# Patient Record
Sex: FEMALE | Race: WHITE | Employment: UNEMPLOYED | URBAN - NONMETROPOLITAN AREA
[De-identification: names, ages, dates, MRNs, and addresses within clinical notes are randomized per-mention and may not be internally consistent; named-entity substitution may affect disease eponyms.]

---

## 2020-03-18 ENCOUNTER — APPOINTMENT (OUTPATIENT)
Dept: GENERAL RADIOLOGY | Age: 50
End: 2020-03-18
Payer: MEDICAID

## 2020-03-18 ENCOUNTER — HOSPITAL ENCOUNTER (EMERGENCY)
Age: 50
Discharge: HOME | End: 2020-03-18
Attending: EMERGENCY MEDICINE
Payer: MEDICAID

## 2020-03-18 VITALS
HEART RATE: 98 BPM | TEMPERATURE: 97.8 F | RESPIRATION RATE: 17 BRPM | WEIGHT: 230 LBS | DIASTOLIC BLOOD PRESSURE: 97 MMHG | SYSTOLIC BLOOD PRESSURE: 156 MMHG | OXYGEN SATURATION: 96 % | HEIGHT: 60 IN | BODY MASS INDEX: 45.16 KG/M2

## 2020-03-18 LAB
ALBUMIN SERPL-MCNC: 4.2 G/DL (ref 3.5–5.2)
ALP BLD-CCNC: 69 U/L (ref 35–104)
ALT SERPL-CCNC: 21 U/L (ref 5–33)
ANION GAP SERPL CALCULATED.3IONS-SCNC: 17 MMOL/L (ref 7–19)
AST SERPL-CCNC: 18 U/L (ref 5–32)
BASOPHILS ABSOLUTE: 0.1 K/UL (ref 0–0.2)
BASOPHILS RELATIVE PERCENT: 0.7 % (ref 0–1)
BILIRUB SERPL-MCNC: <0.2 MG/DL (ref 0.2–1.2)
BUN BLDV-MCNC: 11 MG/DL (ref 6–20)
CALCIUM SERPL-MCNC: 8.9 MG/DL (ref 8.6–10)
CHLORIDE BLD-SCNC: 104 MMOL/L (ref 98–111)
CO2: 21 MMOL/L (ref 22–29)
CREAT SERPL-MCNC: 0.6 MG/DL (ref 0.5–0.9)
D DIMER: <0.27 UG/ML FEU (ref 0–0.48)
EOSINOPHILS ABSOLUTE: 0.1 K/UL (ref 0–0.6)
EOSINOPHILS RELATIVE PERCENT: 1.2 % (ref 0–5)
GFR NON-AFRICAN AMERICAN: >60
GLUCOSE BLD-MCNC: 157 MG/DL (ref 74–109)
HCG QUALITATIVE: NEGATIVE
HCT VFR BLD CALC: 36.5 % (ref 37–47)
HEMOGLOBIN: 10.4 G/DL (ref 12–16)
IMMATURE GRANULOCYTES #: 0.1 K/UL
LYMPHOCYTES ABSOLUTE: 2 K/UL (ref 1.1–4.5)
LYMPHOCYTES RELATIVE PERCENT: 25.8 % (ref 20–40)
MCH RBC QN AUTO: 21.8 PG (ref 27–31)
MCHC RBC AUTO-ENTMCNC: 28.5 G/DL (ref 33–37)
MCV RBC AUTO: 76.7 FL (ref 81–99)
MONOCYTES ABSOLUTE: 0.5 K/UL (ref 0–0.9)
MONOCYTES RELATIVE PERCENT: 6 % (ref 0–10)
NEUTROPHILS ABSOLUTE: 5 K/UL (ref 1.5–7.5)
NEUTROPHILS RELATIVE PERCENT: 65.6 % (ref 50–65)
PDW BLD-RTO: 18.5 % (ref 11.5–14.5)
PLATELET # BLD: 390 K/UL (ref 130–400)
PMV BLD AUTO: 9.2 FL (ref 9.4–12.3)
POTASSIUM SERPL-SCNC: 3.6 MMOL/L (ref 3.5–5)
RBC # BLD: 4.76 M/UL (ref 4.2–5.4)
SODIUM BLD-SCNC: 142 MMOL/L (ref 136–145)
TOTAL PROTEIN: 7.5 G/DL (ref 6.6–8.7)
TROPONIN: <0.01 NG/ML (ref 0–0.03)
TROPONIN: <0.01 NG/ML (ref 0–0.03)
WBC # BLD: 7.6 K/UL (ref 4.8–10.8)

## 2020-03-18 PROCEDURE — 36415 COLL VENOUS BLD VENIPUNCTURE: CPT

## 2020-03-18 PROCEDURE — 85025 COMPLETE CBC W/AUTO DIFF WBC: CPT

## 2020-03-18 PROCEDURE — 99285 EMERGENCY DEPT VISIT HI MDM: CPT

## 2020-03-18 PROCEDURE — 71045 X-RAY EXAM CHEST 1 VIEW: CPT

## 2020-03-18 PROCEDURE — 80053 COMPREHEN METABOLIC PANEL: CPT

## 2020-03-18 PROCEDURE — 93005 ELECTROCARDIOGRAM TRACING: CPT | Performed by: EMERGENCY MEDICINE

## 2020-03-18 PROCEDURE — 85379 FIBRIN DEGRADATION QUANT: CPT

## 2020-03-18 PROCEDURE — 84484 ASSAY OF TROPONIN QUANT: CPT

## 2020-03-18 PROCEDURE — 84703 CHORIONIC GONADOTROPIN ASSAY: CPT

## 2020-03-18 SDOH — HEALTH STABILITY: MENTAL HEALTH: HOW OFTEN DO YOU HAVE A DRINK CONTAINING ALCOHOL?: NEVER

## 2020-03-18 ASSESSMENT — ENCOUNTER SYMPTOMS
SORE THROAT: 0
VOMITING: 0
NAUSEA: 0
CONSTIPATION: 0
RHINORRHEA: 0
WHEEZING: 0
COLOR CHANGE: 0
PHOTOPHOBIA: 0
COUGH: 0
CHEST TIGHTNESS: 0
ABDOMINAL PAIN: 0
EYE PAIN: 0
CHOKING: 1
TROUBLE SWALLOWING: 0
BACK PAIN: 0
DIARRHEA: 0
SHORTNESS OF BREATH: 1
ABDOMINAL DISTENTION: 0

## 2020-03-18 ASSESSMENT — PAIN DESCRIPTION - PAIN TYPE: TYPE: ACUTE PAIN

## 2020-03-18 ASSESSMENT — PAIN DESCRIPTION - LOCATION: LOCATION: CHEST

## 2020-03-18 ASSESSMENT — PAIN SCALES - GENERAL: PAINLEVEL_OUTOF10: 7

## 2020-03-19 LAB
EKG P AXIS: 52 DEGREES
EKG P-R INTERVAL: 148 MS
EKG Q-T INTERVAL: 328 MS
EKG QRS DURATION: 84 MS
EKG QTC CALCULATION (BAZETT): 424 MS
EKG T AXIS: 8 DEGREES

## 2020-03-19 NOTE — ED PROVIDER NOTES
confusion, hallucinations and suicidal ideas. PAST MEDICALHISTORY     Past Medical History:   Diagnosis Date    Anemia     Asthma     Hypoglycemia          SURGICAL HISTORY       Past Surgical History:   Procedure Laterality Date    CERVIX SURGERY       SECTION           CURRENT MEDICATIONS     Previous Medications    No medications on file       ALLERGIES     Codeine and Eucalyptus oil    FAMILY HISTORY     History reviewed. No pertinent family history.        SOCIAL HISTORY       Social History     Socioeconomic History    Marital status:      Spouse name: None    Number of children: None    Years of education: None    Highest education level: None   Occupational History    None   Social Needs    Financial resource strain: None    Food insecurity     Worry: None     Inability: None    Transportation needs     Medical: None     Non-medical: None   Tobacco Use    Smoking status: Never Smoker    Smokeless tobacco: Never Used   Substance and Sexual Activity    Alcohol use: Never     Frequency: Never    Drug use: Never    Sexual activity: None   Lifestyle    Physical activity     Days per week: None     Minutes per session: None    Stress: None   Relationships    Social connections     Talks on phone: None     Gets together: None     Attends Orthodoxy service: None     Active member of club or organization: None     Attends meetings of clubs or organizations: None     Relationship status: None    Intimate partner violence     Fear of current or ex partner: None     Emotionally abused: None     Physically abused: None     Forced sexual activity: None   Other Topics Concern    None   Social History Narrative    None       SCREENINGS             PHYSICAL EXAM    (up to 7 for level 4, 8 or more for level 5)     ED Triage Vitals [20]   BP Temp Temp Source Pulse Resp SpO2 Height Weight   (!) 160/94 97.8 °F (36.6 °C) Oral 111 22 96 % 5' (1.524 m) 230 lb (104.3 kg) Physical Exam  Vitals signs and nursing note reviewed. Constitutional:       General: She is not in acute distress. Appearance: Normal appearance. She is well-developed. She is not ill-appearing. HENT:      Head: Normocephalic and atraumatic. Mouth/Throat:      Mouth: Mucous membranes are moist.      Pharynx: Oropharynx is clear. Eyes:      Conjunctiva/sclera: Conjunctivae normal.      Pupils: Pupils are equal, round, and reactive to light. Neck:      Musculoskeletal: Normal range of motion and neck supple. Vascular: No JVD. Cardiovascular:      Rate and Rhythm: Normal rate and regular rhythm. Heart sounds: Normal heart sounds. No murmur. Pulmonary:      Effort: Pulmonary effort is normal.      Breath sounds: Normal breath sounds. No wheezing, rhonchi or rales. Abdominal:      General: Bowel sounds are normal. There is no distension. Palpations: Abdomen is soft. Tenderness: There is no abdominal tenderness. There is no guarding or rebound. Musculoskeletal: Normal range of motion. Skin:     General: Skin is warm and dry. Capillary Refill: Capillary refill takes less than 2 seconds. Neurological:      General: No focal deficit present. Mental Status: She is alert and oriented to person, place, and time. Cranial Nerves: No cranial nerve deficit. Psychiatric:         Behavior: Behavior normal.         Thought Content: Thought content normal.         Judgment: Judgment normal.         DIAGNOSTIC RESULTS     EKG: All EKG's areinterpreted by the Emergency Department Physician who either signs or Co-signs this chart in the absence of a cardiologist.    Sinus tachycardia with a rate of 115, normal axis, no acute ST elevations or depressions.     RADIOLOGY:  Non-plain film images such as CT, Ultrasound and MRI are read by the radiologist. Plain radiographic images are visualized and preliminarily interpreted bythe emergency physician with the below

## 2020-06-08 ENCOUNTER — HOSPITAL ENCOUNTER (OUTPATIENT)
Dept: GENERAL RADIOLOGY | Facility: HOSPITAL | Age: 50
Discharge: HOME | End: 2020-06-08
Admitting: NURSE PRACTITIONER

## 2020-06-08 ENCOUNTER — TRANSCRIBE ORDERS (OUTPATIENT)
Dept: ADMINISTRATIVE | Facility: HOSPITAL | Age: 50
End: 2020-06-08

## 2020-06-08 DIAGNOSIS — M25.572 PAIN IN LEFT ANKLE AND JOINTS OF LEFT FOOT: ICD-10-CM

## 2020-06-08 DIAGNOSIS — M25.562 LEFT KNEE PAIN, UNSPECIFIED CHRONICITY: Primary | ICD-10-CM

## 2020-06-08 PROCEDURE — 73610 X-RAY EXAM OF ANKLE: CPT

## 2020-06-08 PROCEDURE — 73562 X-RAY EXAM OF KNEE 3: CPT

## 2020-06-10 ENCOUNTER — TRANSCRIBE ORDERS (OUTPATIENT)
Dept: ADMINISTRATIVE | Facility: HOSPITAL | Age: 50
End: 2020-06-10

## 2020-06-10 DIAGNOSIS — M25.562 LEFT KNEE PAIN, UNSPECIFIED CHRONICITY: Primary | ICD-10-CM

## 2020-06-17 ENCOUNTER — HOSPITAL ENCOUNTER (OUTPATIENT)
Dept: MRI IMAGING | Facility: HOSPITAL | Age: 50
Discharge: HOME | End: 2020-06-17
Admitting: NURSE PRACTITIONER

## 2020-06-17 DIAGNOSIS — M25.562 LEFT KNEE PAIN, UNSPECIFIED CHRONICITY: ICD-10-CM

## 2020-06-17 PROCEDURE — 73721 MRI JNT OF LWR EXTRE W/O DYE: CPT

## 2020-07-08 ENCOUNTER — HOSPITAL ENCOUNTER (EMERGENCY)
Age: 50
Discharge: HOME | End: 2020-07-08
Attending: EMERGENCY MEDICINE
Payer: MEDICAID

## 2020-07-08 VITALS
DIASTOLIC BLOOD PRESSURE: 98 MMHG | BODY MASS INDEX: 44.92 KG/M2 | HEART RATE: 104 BPM | OXYGEN SATURATION: 93 % | TEMPERATURE: 97.9 F | RESPIRATION RATE: 17 BRPM | WEIGHT: 230 LBS | SYSTOLIC BLOOD PRESSURE: 148 MMHG

## 2020-07-08 PROCEDURE — 99999 PR OFFICE/OUTPT VISIT,PROCEDURE ONLY: CPT | Performed by: EMERGENCY MEDICINE

## 2020-07-08 PROCEDURE — 99283 EMERGENCY DEPT VISIT LOW MDM: CPT

## 2020-07-08 RX ORDER — NAPROXEN 500 MG/1
500 TABLET ORAL 2 TIMES DAILY WITH MEALS
Qty: 180 TABLET | Refills: 1 | Status: SHIPPED | OUTPATIENT
Start: 2020-07-08

## 2020-07-08 RX ORDER — CYCLOBENZAPRINE HCL 10 MG
10 TABLET ORAL 3 TIMES DAILY PRN
Qty: 15 TABLET | Refills: 0 | Status: SHIPPED | OUTPATIENT
Start: 2020-07-08 | End: 2020-07-18

## 2020-07-08 ASSESSMENT — ENCOUNTER SYMPTOMS
EYE REDNESS: 0
EYE PAIN: 0
SHORTNESS OF BREATH: 0
VOMITING: 0
DIARRHEA: 0
VOICE CHANGE: 0
ABDOMINAL PAIN: 0
RHINORRHEA: 0
COUGH: 0

## 2020-07-08 NOTE — ED PROVIDER NOTES
[facility] EMERGENCY DEPT  EMERGENCY DEPARTMENT ENCOUNTER      Pt Name: Zach Gaviria  MRN: [id number]  [place] 1970  Date of evaluation: 7/8/2020  Provider: Louis Myers MD    [address] [place] [address]       Chief Complaint   Patient presents with    Leg Pain     left leg pain since Mother's Day         HISTORY OF PRESENT ILLNESS   (Location/Symptom, Timing/Onset,Context/Setting, Quality, Duration, Modifying Factors, Severity)  Note limiting factors. Zach Gaviria is a 53 y.o. female who presents to the emergency department with complaint of pain that radiates from the left foot all the way up to her left back. States the pain is been present Mother's Day of this year when she stepped in a hole with her right foot causing her to fall forward and landed awkwardly on her left foot. States pain was initially not that bad but his gradually worsened since then. Pain is worse at the end of the day after being up walking on it. 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 She was seen by primary care provider recently and had x-ray showed left ACL tear and meniscal injury. States she has tried to set up an appointment with orthopedic surgery but has not been successful so far. States she has occasional swelling mostly around the left knee but seems to occasionally radiate left calf and ankle area. Patient denies any previous medical problems. No history of prior PE or DVT. HPI    NursingNotes were reviewed. REVIEW OF SYSTEMS    (2-9 systems for level 4, 10 or more for level 5)     Review of Systems   Constitutional: Negative for fatigue and fever. HENT: Negative for congestion, rhinorrhea and voice change. Eyes: Negative for pain and redness. Respiratory: Negative for cough and shortness of breath. Cardiovascular: Negative for chest pain. Gastrointestinal: Negative for abdominal pain, diarrhea and vomiting. Endocrine: Negative. Genitourinary: Negative.     Musculoskeletal: Positive for arthralgias. Negative for gait problem. Skin: Negative for rash and wound. Neurological: Negative for weakness and headaches. Hematological: Negative. Psychiatric/Behavioral: Negative. All other systems reviewed and are negative. A complete review of systems was performed and is negative except as noted above in the HPI. PAST MEDICAL HISTORY     Past Medical History:   Diagnosis Date    Anemia     Asthma     Hypoglycemia          SURGICAL HISTORY       Past Surgical History:   Procedure Laterality Date    CERVIX SURGERY       SECTION           CURRENT MEDICATIONS       Discharge Medication List as of 2020  2:44 AM          ALLERGIES     Codeine and Eucalyptus oil    FAMILY HISTORY     History reviewed. No pertinent family history.        SOCIAL HISTORY       Social History     Socioeconomic History    Marital status:      Spouse name: None    Number of children: None    Years of education: None    Highest education level: None   Occupational History    None   Social Needs    Financial resource strain: None    Food insecurity     Worry: None     Inability: None    Transportation needs     Medical: None     Non-medical: None   Tobacco Use    Smoking status: Never Smoker    Smokeless tobacco: Never Used   Substance and Sexual Activity    Alcohol use: Never     Frequency: Never    Drug use: Never    Sexual activity: None   Lifestyle    Physical activity     Days per week: None     Minutes per session: None    Stress: None   Relationships    Social connections     Talks on phone: None     Gets together: None     Attends Confucianist service: None     Active member of club or organization: None     Attends meetings of clubs or organizations: None     Relationship status: None    Intimate partner violence     Fear of current or ex partner: None     Emotionally abused: None     Physically abused: None     Forced sexual activity: None   Other Topics cardiologist.      RADIOLOGY:   Non-plain film images such as CT, Ultrasound and MRI are read by the radiologist. Jurline Edgeley images are visualized and preliminarily interpreted by the emergency physician with the below findings:        Interpretation per the Radiologist below, if available at the time of this note:    No orders to display         ED BEDSIDE ULTRASOUND:   Performed by ED Physician - none    LABS:  Labs Reviewed - No data to display    All other labs were within normal range or not returned as of this dictation. EMERGENCY DEPARTMENT COURSE and DIFFERENTIALDIAGNOSIS/MDM:   Vitals:    Vitals:    07/08/20 0128 07/08/20 0233   BP: (!) 168/99 (!) 148/98   Pulse: 113 104   Resp: 18 17   Temp: 97.9 °F (36.6 °C)    SpO2:  93%   Weight: 230 lb (104.3 kg)        MDM  MRI from [facility] reviewed. Patient with no swelling at this time. Presentation seems to be all related to orthopedic injuries. No signs concerning for DVT that are felt to require further work-up at this time especially given the abnormal MRI findings which would explain presentation. Evaluation and work-up here revealed no signs of emergent or life-threatening pathology that would necessitate admission for further work-up or management at this time. It is felt to be stable for discharge home with return precautions for worsening of the condition or development of new concerning symptoms. Patient was encouraged to follow-up with their primary care doctor in the appropriate timeframe. Necessary prescriptions and information have been provided for treatment at home. Patient voices understanding and agreement with the plan. CONSULTS:  None    PROCEDURES:  Unless otherwise notedbelow, none     Procedures    FINAL IMPRESSION     1. Left leg pain    2. Rupture of anterior cruciate ligament of left knee, initial encounter    3.  Acute lateral meniscal tear, left, initial encounter          DISPOSITION/PLAN   DISPOSITION Decision To Discharge 07/08/2020 02:47:58 AM      PATIENT REFERRED TO:  [facility] EMERGENCY DEPT  Rajeev King  [phone]    If symptoms worsen    Alyse Mendoza MD  [address] [place] [address]  [phone]    Schedule an appointment as soon as possible for a visit         DISCHARGE MEDICATIONS:  Discharge Medication List as of 7/8/2020  2:44 AM      START taking these medications    Details   cyclobenzaprine (FLEXERIL) 10 MG tablet Take 1 tablet by mouth 3 times daily as needed for Muscle spasms, Disp-15 tablet, R-0Print      naproxen (NAPROSYN) 500 MG tablet Take 1 tablet by mouth 2 times daily (with meals), Disp-180 tablet, R-1Print                (Please note that portions of this note were completed with a voice recognition program.  Efforts were made to edit the dictations butoccasionally words are mis-transcribed.)    Izzy Orr MD (electronically signed)  Emergency Physician          Izzy Orr., MD  07/08/20 8303

## 2020-07-08 NOTE — ED NOTES
PT presents to ED c/o left leg pain. PT states she stepped into a hole on Mother's Day and twisted her left ankle. PT states the pain starts in her left foot and radiates up leg.      Keshia Schaefer RN  07/08/20 0132

## 2020-08-19 ENCOUNTER — HOSPITAL ENCOUNTER (OUTPATIENT)
Dept: GENERAL RADIOLOGY | Facility: HOSPITAL | Age: 50
Discharge: HOME | End: 2020-08-19
Admitting: NURSE PRACTITIONER

## 2020-08-19 PROCEDURE — 72040 X-RAY EXAM NECK SPINE 2-3 VW: CPT

## 2020-09-04 ENCOUNTER — OFFICE VISIT (OUTPATIENT)
Dept: FAMILY MEDICINE CLINIC | Facility: CLINIC | Age: 50
End: 2020-09-04

## 2020-09-04 VITALS
TEMPERATURE: 98.4 F | BODY MASS INDEX: 48.81 KG/M2 | OXYGEN SATURATION: 98 % | HEIGHT: 60 IN | WEIGHT: 248.6 LBS | HEART RATE: 116 BPM | SYSTOLIC BLOOD PRESSURE: 160 MMHG | RESPIRATION RATE: 18 BRPM | DIASTOLIC BLOOD PRESSURE: 100 MMHG

## 2020-09-04 DIAGNOSIS — D64.9 ANEMIA, UNSPECIFIED TYPE: Primary | ICD-10-CM

## 2020-09-04 DIAGNOSIS — I10 ESSENTIAL HYPERTENSION: ICD-10-CM

## 2020-09-04 DIAGNOSIS — E66.01 MORBID (SEVERE) OBESITY DUE TO EXCESS CALORIES (HCC): ICD-10-CM

## 2020-09-04 DIAGNOSIS — F41.9 ANXIETY: ICD-10-CM

## 2020-09-04 PROCEDURE — 99214 OFFICE O/P EST MOD 30 MIN: CPT | Performed by: FAMILY MEDICINE

## 2020-09-04 RX ORDER — ESCITALOPRAM OXALATE 10 MG/1
10 TABLET ORAL EVERY MORNING
Qty: 30 TABLET | Refills: 1 | Status: SHIPPED | OUTPATIENT
Start: 2020-09-04 | End: 2021-11-24

## 2020-09-04 RX ORDER — LISINOPRIL 10 MG/1
10 TABLET ORAL DAILY
Qty: 30 TABLET | Refills: 1 | Status: SHIPPED | OUTPATIENT
Start: 2020-09-04 | End: 2021-11-24 | Stop reason: DRUGHIGH

## 2020-09-04 RX ORDER — NAPROXEN 250 MG/1
TABLET ORAL
COMMUNITY
Start: 2020-08-25

## 2020-09-24 ENCOUNTER — TELEPHONE (OUTPATIENT)
Dept: FAMILY MEDICINE CLINIC | Facility: CLINIC | Age: 50
End: 2020-09-24

## 2020-09-24 NOTE — TELEPHONE ENCOUNTER
Patient is requesting to have orders faxed over to [facility] [phone]. For labs that that were placed on 09/04

## 2021-11-24 ENCOUNTER — PATIENT ROUNDING (BHMG ONLY) (OUTPATIENT)
Dept: CARDIOLOGY | Facility: CLINIC | Age: 51
End: 2021-11-24

## 2021-11-24 ENCOUNTER — OFFICE VISIT (OUTPATIENT)
Dept: CARDIOLOGY | Facility: CLINIC | Age: 51
End: 2021-11-24

## 2021-11-24 VITALS
WEIGHT: 253 LBS | DIASTOLIC BLOOD PRESSURE: 70 MMHG | OXYGEN SATURATION: 97 % | HEART RATE: 92 BPM | HEIGHT: 60 IN | BODY MASS INDEX: 49.67 KG/M2 | SYSTOLIC BLOOD PRESSURE: 122 MMHG

## 2021-11-24 DIAGNOSIS — E78.2 MIXED HYPERLIPIDEMIA: ICD-10-CM

## 2021-11-24 DIAGNOSIS — R07.9 CHEST PAIN, UNSPECIFIED TYPE: Primary | ICD-10-CM

## 2021-11-24 DIAGNOSIS — E66.01 MORBID OBESITY WITH BMI OF 45.0-49.9, ADULT (HCC): ICD-10-CM

## 2021-11-24 DIAGNOSIS — I10 ESSENTIAL HYPERTENSION: ICD-10-CM

## 2021-11-24 PROBLEM — D50.9 IRON DEFICIENCY ANEMIA: Status: ACTIVE | Noted: 2021-11-24

## 2021-11-24 PROBLEM — J45.909 ASTHMA: Status: ACTIVE | Noted: 2021-11-24

## 2021-11-24 PROCEDURE — 93000 ELECTROCARDIOGRAM COMPLETE: CPT | Performed by: INTERNAL MEDICINE

## 2021-11-24 PROCEDURE — 99204 OFFICE O/P NEW MOD 45 MIN: CPT | Performed by: INTERNAL MEDICINE

## 2021-11-24 RX ORDER — CIPROFLOXACIN 500 MG/1
TABLET, FILM COATED ORAL
COMMUNITY
Start: 2021-10-28

## 2021-11-24 RX ORDER — LISINOPRIL 5 MG/1
5 TABLET ORAL DAILY
COMMUNITY

## 2021-11-24 RX ORDER — ROSUVASTATIN CALCIUM 10 MG/1
10 TABLET, COATED ORAL DAILY
COMMUNITY

## 2021-11-24 RX ORDER — MONTELUKAST SODIUM 10 MG/1
10 TABLET ORAL NIGHTLY
COMMUNITY

## 2021-11-24 RX ORDER — IBUPROFEN 200 MG
200 TABLET ORAL EVERY 6 HOURS PRN
COMMUNITY

## 2021-11-24 RX ORDER — SERTRALINE HYDROCHLORIDE 25 MG/1
TABLET, FILM COATED ORAL
COMMUNITY
Start: 2021-10-28

## 2021-11-24 NOTE — PROGRESS NOTES
November 24, 2021    Hello, may I speak with Amanda Khan?    My name is [facility] , Practice Lead       I am  with [facility]IOLOG[facility]  [address] [facility]  [place] [address] [phone].    Before we get started may I verify your date of birth? 1970    I am calling to officially welcome you to our practice and ask about your recent visit. Is this a good time to talk? yes    Tell me about your visit with us. What things went well?  It went well.        We're always looking for ways to make our patients' experiences even better. Do you have recommendations on ways we may improve?  no    Overall were you satisfied with your  visit to our practice? yes       I appreciate you taking the time to speak with me today. Is there anything else I can do for you? no      Thank you, and have a great day.

## 2021-11-24 NOTE — PROGRESS NOTES
November 24, 2021    Hello, may I speak with Amanda Khan?    My name is [facility], Practice Lead     I am  with [facility] CARDIOLOGY  [address] [facility]  [place] [address] [phone].    Before we get started may I verify your date of birth? 1970    I am calling to officially welcome you to our practice and ask about your recent visit. Is this a good time to talk? yes    Tell me about your visit with us. What things went well?  It was fine        We're always looking for ways to make our patients' experiences even better. Do you have recommendations on ways we may improve?  no    Overall were you satisfied with your first visit to our practice? yes       I appreciate you taking the time to speak with me today. Is there anything else I can do for you? Yes, I felt very pleased with my visit and the office today.       Thank you, and have a great day.

## 2021-11-24 NOTE — PROGRESS NOTES
Reason for Visit: Chest pain.    HPI:  Amanda Khan is a 51 y.o. female is being seen for consultation today at the request of JEISON Callaway for evaluation of chest pain.  The chest pain has been going on for a couple of months.  She has been more stressed and thinks this might be causing it.  The pain is intermittent and happens randomly.  She describes the pain as tight and heavy.  It usually lasts a couple of minutes before going away.  Sometimes she will notice associated pain in her arm.  She denies any palpitations, dizziness, syncope, PND, or orthopnea.  She does not get much exercise and can't walk very far at one sitting.      Previous Cardiac Testing and Procedures:  -Lipid panel (10/28/2021) total cholesterol 245, HDL 48, , triglycerides 329  -BMP (10/28/2021) creatinine 0.5, potassium 4.3, sodium 142    Patient Active Problem List   Diagnosis   • Asthma   • Essential hypertension   • Morbid obesity with BMI of 45.0-49.9, adult (HCC)   • Mixed hyperlipidemia   • Iron deficiency anemia       Social History     Tobacco Use   • Smoking status: Never Smoker   • Smokeless tobacco: Never Used   Substance Use Topics   • Alcohol use: Never   • Drug use: Never       History reviewed. No pertinent family history.    The following portions of the patient's history were reviewed and updated as appropriate: allergies, current medications, past family history, past medical history, past social history, past surgical history and problem list.      Current Outpatient Medications:   •  albuterol sulfate HFA (Ventolin HFA) 108 (90 Base) MCG/ACT inhaler, Inhale 2 puffs Every 4 (Four) Hours As Needed for Wheezing., Disp: 6.7 g, Rfl: 0  •  ciprofloxacin (CIPRO) 500 MG tablet, , Disp: , Rfl:   •  cyclobenzaprine (FLEXERIL) 10 MG tablet, Take 1 tablet by mouth 3 (Three) Times a Day As Needed for Muscle Spasms., Disp: 30 tablet, Rfl: 0  •  ibuprofen (ADVIL,MOTRIN) 200 MG tablet, Take 200 mg by mouth Every 6  "(Six) Hours As Needed for Mild Pain ., Disp: , Rfl:   •  lisinopril (PRINIVIL,ZESTRIL) 5 MG tablet, Take 5 mg by mouth Daily., Disp: , Rfl:   •  montelukast (SINGULAIR) 10 MG tablet, Take 10 mg by mouth Every Night., Disp: , Rfl:   •  rosuvastatin (CRESTOR) 10 MG tablet, Take 10 mg by mouth Daily., Disp: , Rfl:   •  sertraline (ZOLOFT) 25 MG tablet, , Disp: , Rfl:   •  naproxen (NAPROSYN) 250 MG tablet, , Disp: , Rfl:     Review of Systems   Constitutional: Negative for chills, fever and weight loss.   HENT: Negative for sore throat.    Eyes: Negative for blurred vision and visual disturbance.   Cardiovascular: Positive for chest pain. Negative for dyspnea on exertion, leg swelling, palpitations, paroxysmal nocturnal dyspnea and syncope.   Respiratory: Negative for cough and shortness of breath.    Endocrine: Negative for cold intolerance and polyuria.   Skin: Negative for itching and rash.   Musculoskeletal: Positive for back pain and joint pain. Negative for myalgias.   Gastrointestinal: Negative for abdominal pain, diarrhea and vomiting.   Genitourinary: Negative for dysuria and hematuria.   Neurological: Positive for disturbances in coordination. Negative for dizziness, headaches and numbness.   Psychiatric/Behavioral: Negative for depression. The patient is not nervous/anxious.    Allergic/Immunologic: Negative for hives.       Objective   /70 (BP Location: Left arm, Patient Position: Sitting, Cuff Size: Large Adult)   Pulse 92   Ht 152.4 cm (60\")   Wt 115 kg (253 lb)   SpO2 97%   BMI 49.41 kg/m²   Constitutional:       Appearance: Well-developed. Morbidly obese.   HENT:      Head: Normocephalic and atraumatic.   Pulmonary:      Effort: Pulmonary effort is normal.      Breath sounds: Normal breath sounds.   Cardiovascular:      Normal rate. Regular rhythm.      Murmurs: There is no murmur.      No gallop.   Skin:     General: Skin is warm and dry.   Neurological:      Mental Status: Alert and " oriented to person, place, and time.         ECG 12 Lead    Date/Time: 11/24/2021 10:20 AM  Performed by: Ramy Cook MD  Authorized by: Ramy Cook MD   Previous ECG: no previous ECG available  Rhythm: sinus rhythm  Rate: normal    Clinical impression: normal ECG              ICD-10-CM ICD-9-CM   1. Chest pain, unspecified type  R07.9 786.50   2. Mixed hyperlipidemia  E78.2 272.2   3. Essential hypertension  I10 401.9   4. Morbid obesity with BMI of 45.0-49.9, adult ([facility])  E66.01 278.01    Z68.42 V85.42         Assessment/Plan:  1.  Chest pain: Mostly atypical characteristics.  Given her risk factors will evaluate further with a stress echo.  This will need to be a dobutamine stress echocardiogram given her morbid obesity and poor functional capacity.    2.  Mixed hyperlipidemia: Lipid panel from 10/20/2021 shows elevated total cholesterol, LDL, and triglycerides.  Continue rosuvastatin and  on lifestyle modification.    3.  Essential hypertension: Blood pressures well controlled today.  Continue lisinopril.    4.  Morbid obesity: Patient's Body mass index is 49.41 kg/m². indicating that she is morbidly obese (BMI > 40 or > 35 with obesity - related health condition). Obesity-related health conditions include the following: hypertension and dyslipidemias. Obesity is newly identified. BMI is is above average; BMI management plan is completed. We discussed portion control and increasing exercise.

## 2023-12-10 NOTE — PROGRESS NOTES
CC:   Chief Complaint   Patient presents with   • Establish Care   • Anxiety     wants to discuss increased anxiety       History:  Amanda Khan is a 50 y.o. female who presents today for evaluation of the above problems.     Here to establish care with worsened anxiety despite use of hydroxyzine, says that this medication can make her feel sleepy and tired.    Morbidly obese without any recent labs for screening, also reports history of anemia.    Blood pressure elevated today with history of elevations in the past    ROS:  Review of Systems   Respiratory: Negative.    Cardiovascular: Negative.    Psychiatric/Behavioral: Positive for behavioral problems. The patient is nervous/anxious.    All other systems reviewed and are negative.      Allergies   Allergen Reactions   • Octavio [Salvia Officinalis] Anaphylaxis   • Benadryl [Diphenhydramine] Hives   • Codeine Unknown - High Severity   • Eucalyptus Oil Unknown - Low Severity     Past Medical History:   Diagnosis Date   • Anemia    • Anxiety    • Asthma    • Depression    • Hypertension      Past Surgical History:   Procedure Laterality Date   •  SECTION       History reviewed. No pertinent family history.   reports that she has never smoked. She has never used smokeless tobacco. She reports that she does not drink alcohol or use drugs.      Current Outpatient Medications:   •  albuterol sulfate HFA (Ventolin HFA) 108 (90 Base) MCG/ACT inhaler, Inhale 2 puffs Every 4 (Four) Hours As Needed for Wheezing., Disp: 6.7 g, Rfl: 0  •  cyclobenzaprine (FLEXERIL) 10 MG tablet, Take 1 tablet by mouth 3 (Three) Times a Day As Needed for Muscle Spasms., Disp: 30 tablet, Rfl: 0  •  naproxen (NAPROSYN) 250 MG tablet, , Disp: , Rfl:   •  escitalopram (LEXAPRO) 10 MG tablet, Take 1 tablet by mouth Every Morning., Disp: 30 tablet, Rfl: 1  •  lisinopril (PRINIVIL,ZESTRIL) 10 MG tablet, Take 1 tablet by mouth Daily., Disp: 30 tablet, Rfl: 1    OBJECTIVE:  /100 (BP  "Location: Left arm, Patient Position: Sitting, Cuff Size: Adult)   Pulse 116   Temp 98.4 °F (36.9 °C) (Temporal)   Resp 18   Ht 152.4 cm (60\")   Wt 113 kg (248 lb 9.6 oz)   SpO2 98%   BMI 48.55 kg/m²    Physical Exam   Constitutional: She is oriented to person, place, and time. No distress.   HENT:   Head: Normocephalic and atraumatic.   Nose: Nose normal.   Eyes: Conjunctivae are normal. Right eye exhibits no discharge. Left eye exhibits no discharge. No scleral icterus.   Neck: No tracheal deviation present.   Cardiovascular: Normal rate, regular rhythm and normal heart sounds. Exam reveals no gallop and no friction rub.   No murmur heard.  Pulmonary/Chest: Effort normal and breath sounds normal. No respiratory distress. She has no wheezes. She has no rales.   Neurological: She is alert and oriented to person, place, and time.   Skin: Skin is warm and dry. She is not diaphoretic. No pallor.   Psychiatric: She has a normal mood and affect. Her behavior is normal. Judgment and thought content normal.   Nursing note and vitals reviewed.      Assessment/Plan     Diagnosis Plan   1. Anemia, unspecified type  CBC No Differential   2. Morbid (severe) obesity due to excess calories (CMS/HCC)  TSH    Comprehensive Metabolic Panel    Lipid panel    Hemoglobin A1c   3. Anxiety  escitalopram (LEXAPRO) 10 MG tablet   4. Essential hypertension  lisinopril (PRINIVIL,ZESTRIL) 10 MG tablet   Labs above  Start lisinopril  Lexapro trial  Follow-up 4 to 6 weeks    Patient's Body mass index is 48.55 kg/m². BMI is above normal parameters. Recommendations include: exercise counseling and nutrition counseling.    Ankit Riggs D.O.  [facility]  Osteopathic Neuromusculoskeletal Medicine  9/10/2020  " stated

## 2024-01-10 ENCOUNTER — TRANSCRIBE ORDERS (OUTPATIENT)
Dept: GENERAL RADIOLOGY | Facility: HOSPITAL | Age: 54
End: 2024-01-10
Payer: MEDICAID

## 2024-01-10 DIAGNOSIS — M51.36 DEGENERATION OF LUMBAR INTERVERTEBRAL DISC: Primary | ICD-10-CM
